# Patient Record
Sex: MALE | Race: WHITE | NOT HISPANIC OR LATINO | URBAN - METROPOLITAN AREA
[De-identification: names, ages, dates, MRNs, and addresses within clinical notes are randomized per-mention and may not be internally consistent; named-entity substitution may affect disease eponyms.]

---

## 2022-06-29 ENCOUNTER — EMERGENCY (EMERGENCY)
Facility: HOSPITAL | Age: 52
LOS: 0 days | Discharge: HOME | End: 2022-06-29
Attending: EMERGENCY MEDICINE | Admitting: EMERGENCY MEDICINE

## 2022-06-29 VITALS
SYSTOLIC BLOOD PRESSURE: 136 MMHG | WEIGHT: 265 LBS | HEART RATE: 73 BPM | DIASTOLIC BLOOD PRESSURE: 76 MMHG | RESPIRATION RATE: 17 BRPM | TEMPERATURE: 99 F | OXYGEN SATURATION: 99 %

## 2022-06-29 DIAGNOSIS — L02.212 CUTANEOUS ABSCESS OF BACK [ANY PART, EXCEPT BUTTOCK]: ICD-10-CM

## 2022-06-29 PROCEDURE — 10060 I&D ABSCESS SIMPLE/SINGLE: CPT

## 2022-06-29 PROCEDURE — 99283 EMERGENCY DEPT VISIT LOW MDM: CPT | Mod: 25

## 2022-06-29 RX ORDER — CEPHALEXIN 500 MG
1 CAPSULE ORAL
Qty: 28 | Refills: 0
Start: 2022-06-29 | End: 2022-07-05

## 2022-06-29 NOTE — ED PROVIDER NOTE - NSFOLLOWUPINSTRUCTIONS_ED_ALL_ED_FT
Follow up with your primary care doctor in 1-2 days     Abscess    WHAT YOU NEED TO KNOW:    A warm compress may help your abscess drain. Your healthcare provider may make a cut in the abscess so it can drain. You may need surgery to remove an abscess that is on your hands or buttocks.     DISCHARGE INSTRUCTIONS:    Return to the emergency department if:     The area around your abscess becomes very painful, warm, or has red streaks.      You have a fever and chills.      Your heart is beating faster than usual.       You feel faint or confused.    Contact your healthcare provider if:     Your abscess gets bigger or does not get better.      Your abscess returns.      You have questions or concerns about your condition or care.    Medicines: You may need any of the following:     Antibiotics help treat a bacterial infection.       Acetaminophen decreases pain and fever. It is available without a doctor's order. Ask how much to take and how often to take it. Follow directions. Acetaminophen can cause liver damage if not taken correctly.      NSAIDs, such as ibuprofen, help decrease swelling, pain, and fever. This medicine is available with or without a doctor's order. NSAIDs can cause stomach bleeding or kidney problems in certain people. If you take blood thinner medicine, always ask your healthcare provider if NSAIDs are safe for you. Always read the medicine label and follow directions.      Take your medicine as directed. Contact your healthcare provider if you think your medicine is not helping or if you have side effects. Tell him or her if you are allergic to any medicine. Keep a list of the medicines, vitamins, and herbs you take. Include the amounts, and when and why you take them. Bring the list or the pill bottles to follow-up visits. Carry your medicine list with you in case of an emergency.    Self-care:     Apply a warm compress to your abscess. This will help it open and drain. Wet a washcloth in warm, but not hot, water. Apply the compress for 10 minutes. Repeat this 4 times each day. Do not press on an abscess or try to open it with a needle. You may push the bacteria deeper or into your blood.       Do not share your clothes, towels, or sheets with anyone. This can spread the infection to others.       Wash your hands often. This can help prevent the spread of germs. Use soap and water or an alcohol-based hand rub.     Care for your wound after it is drained:     Care for your wound as directed. If your healthcare provider says it is okay, carefully remove the bandage and gauze packing. You may need to soak the gauze to get it out of your wound. Clean your wound and the area around it as directed. Dry the area and put on new, clean bandages. Change your bandages when they get wet or dirty.       Ask your healthcare provider how to change the gauze in your wound. Keep track of how many pieces of gauze are placed inside the wound. Do not put too much packing in the wound. Do not pack the gauze too tightly in your wound.     Follow up with your healthcare provider in 1 to 3 days: You may need to have your packing removed or your bandage changed. Write down your questions so you remember to ask them during your visits.        © Copyright [employer] 2019 All illustrations and images included in CareNotes are the copyrighted property of A.D.A.M., Inc. or [employer].

## 2022-06-29 NOTE — ED PROVIDER NOTE - OBJECTIVE STATEMENT
52-year-old male no segment past medical history comes to the emergency room for abscess on upper back.  Patient states he had similar abscess several years ago and was cut open.  Patient states that over the last couple days increasing redness swelling and tenderness to the area.  Denies fever

## 2022-06-29 NOTE — ED PROVIDER NOTE - NS ED ROS FT
Constitutional: (-) fever  Musculoskeletal: (-) neck pain, (-) back pain, (-) joint pain  Integumentary: (-) rash, (-) edema  Neurological: (-) headache, (-) altered mental status

## 2022-06-29 NOTE — ED PROVIDER NOTE - PATIENT PORTAL LINK FT
You can access the FollowMyHealth Patient Portal offered by [facility] by registering at the following website: [email or web address] By joining [email or web address] FollowMyHealth portal, you will also be able to view your health information using other applications (apps) compatible with our system.

## 2022-06-29 NOTE — ED PROVIDER NOTE - CLINICAL SUMMARY MEDICAL DECISION MAKING FREE TEXT BOX
52yF  pw abscess to back started 2-3 weeks ago - increasing. no systemic symptoms,    + I and D  + purulent outpt,  abx prescribed  Patient to be discharged from ED in well appearing condition. Verbal instructions given, including instructions to return to ED immediately for any new, worsening, or concerning symptoms. Limitations of ED work up discussed.  Patient reports understanding of above with capacity and insight. Written discharge instructions additionally given, including follow-up plan.

## 2022-06-29 NOTE — ED PROVIDER NOTE - CARE PROVIDER_API CALL
Justine Valenzuela)  Surgery  [address] [place] [address]  Phone: [phone]  Fax: [phone]  Follow Up Time:

## 2022-06-29 NOTE — ED ADULT NURSE NOTE - NSIMPLEMENTINTERV_GEN_ALL_ED
Implemented All Universal Safety Interventions:  [place] to call system. Call bell, personal items and telephone within reach. Instruct patient to call for assistance. Room bathroom lighting operational. Non-slip footwear when patient is off stretcher. Physically safe environment: no spills, clutter or unnecessary equipment. Stretcher in lowest position, wheels locked, appropriate side rails in place.

## 2022-06-29 NOTE — ED PROVIDER NOTE - PHYSICAL EXAMINATION
Physical Exam    Vital Signs: I have reviewed the initial vital signs.  Constitutional: well-nourished, appears stated age, no acute distress  Musculoskeletal: supple neck, no lower extremity edema, no midline tenderness  Integumentary: warm, dry, Positive left upper back abscess with surrounding redness and swelling noted to area positive fluctuance and induration  Neurologic: awake, alert, extremities’ motor and sensory functions grossly intact  Psychiatric: appropriate mood, appropriate affect

## 2022-07-15 ENCOUNTER — OUTPATIENT (OUTPATIENT)
Dept: OUTPATIENT SERVICES | Facility: HOSPITAL | Age: 52
LOS: 1 days | Discharge: HOME | End: 2022-07-15

## 2022-07-15 VITALS
HEART RATE: 70 BPM | OXYGEN SATURATION: 100 % | TEMPERATURE: 97 F | WEIGHT: 244.05 LBS | RESPIRATION RATE: 16 BRPM | DIASTOLIC BLOOD PRESSURE: 84 MMHG | HEIGHT: 69 IN | SYSTOLIC BLOOD PRESSURE: 137 MMHG

## 2022-07-15 DIAGNOSIS — Z01.818 ENCOUNTER FOR OTHER PREPROCEDURAL EXAMINATION: ICD-10-CM

## 2022-07-15 DIAGNOSIS — D21.6 BENIGN NEOPLASM OF CONNECTIVE AND OTHER SOFT TISSUE OF TRUNK, UNSPECIFIED: ICD-10-CM

## 2022-07-15 DIAGNOSIS — Z98.890 OTHER SPECIFIED POSTPROCEDURAL STATES: Chronic | ICD-10-CM

## 2022-07-15 DIAGNOSIS — Z96.642 PRESENCE OF LEFT ARTIFICIAL HIP JOINT: Chronic | ICD-10-CM

## 2022-07-15 LAB
ALBUMIN SERPL ELPH-MCNC: 5.1 G/DL — SIGNIFICANT CHANGE UP (ref 3.5–5.2)
ALP SERPL-CCNC: 123 U/L — HIGH (ref 30–115)
ALT FLD-CCNC: 28 U/L — SIGNIFICANT CHANGE UP (ref 0–41)
ANION GAP SERPL CALC-SCNC: 13 MMOL/L — SIGNIFICANT CHANGE UP (ref 7–14)
AST SERPL-CCNC: 25 U/L — SIGNIFICANT CHANGE UP (ref 0–41)
BASOPHILS # BLD AUTO: 0.04 K/UL — SIGNIFICANT CHANGE UP (ref 0–0.2)
BASOPHILS NFR BLD AUTO: 0.5 % — SIGNIFICANT CHANGE UP (ref 0–1)
BILIRUB SERPL-MCNC: 0.4 MG/DL — SIGNIFICANT CHANGE UP (ref 0.2–1.2)
BUN SERPL-MCNC: 26 MG/DL — HIGH (ref 10–20)
CALCIUM SERPL-MCNC: 9.2 MG/DL — SIGNIFICANT CHANGE UP (ref 8.5–10.1)
CHLORIDE SERPL-SCNC: 103 MMOL/L — SIGNIFICANT CHANGE UP (ref 98–110)
CO2 SERPL-SCNC: 22 MMOL/L — SIGNIFICANT CHANGE UP (ref 17–32)
CREAT SERPL-MCNC: 1 MG/DL — SIGNIFICANT CHANGE UP (ref 0.7–1.5)
EGFR: 90 ML/MIN/1.73M2 — SIGNIFICANT CHANGE UP
EOSINOPHIL # BLD AUTO: 0.27 K/UL — SIGNIFICANT CHANGE UP (ref 0–0.7)
EOSINOPHIL NFR BLD AUTO: 3.6 % — SIGNIFICANT CHANGE UP (ref 0–8)
GLUCOSE SERPL-MCNC: 85 MG/DL — SIGNIFICANT CHANGE UP (ref 70–99)
HCT VFR BLD CALC: 41.9 % — LOW (ref 42–52)
HGB BLD-MCNC: 14 G/DL — SIGNIFICANT CHANGE UP (ref 14–18)
IMM GRANULOCYTES NFR BLD AUTO: 0.1 % — SIGNIFICANT CHANGE UP (ref 0.1–0.3)
LYMPHOCYTES # BLD AUTO: 2.08 K/UL — SIGNIFICANT CHANGE UP (ref 1.2–3.4)
LYMPHOCYTES # BLD AUTO: 28 % — SIGNIFICANT CHANGE UP (ref 20.5–51.1)
MCHC RBC-ENTMCNC: 30.1 PG — SIGNIFICANT CHANGE UP (ref 27–31)
MCHC RBC-ENTMCNC: 33.4 G/DL — SIGNIFICANT CHANGE UP (ref 32–37)
MCV RBC AUTO: 90.1 FL — SIGNIFICANT CHANGE UP (ref 80–94)
MONOCYTES # BLD AUTO: 0.58 K/UL — SIGNIFICANT CHANGE UP (ref 0.1–0.6)
MONOCYTES NFR BLD AUTO: 7.8 % — SIGNIFICANT CHANGE UP (ref 1.7–9.3)
NEUTROPHILS # BLD AUTO: 4.45 K/UL — SIGNIFICANT CHANGE UP (ref 1.4–6.5)
NEUTROPHILS NFR BLD AUTO: 60 % — SIGNIFICANT CHANGE UP (ref 42.2–75.2)
NRBC # BLD: 0 /100 WBCS — SIGNIFICANT CHANGE UP (ref 0–0)
PLATELET # BLD AUTO: 303 K/UL — SIGNIFICANT CHANGE UP (ref 130–400)
POTASSIUM SERPL-MCNC: 4.5 MMOL/L — SIGNIFICANT CHANGE UP (ref 3.5–5)
POTASSIUM SERPL-SCNC: 4.5 MMOL/L — SIGNIFICANT CHANGE UP (ref 3.5–5)
PROT SERPL-MCNC: 7.6 G/DL — SIGNIFICANT CHANGE UP (ref 6–8)
RBC # BLD: 4.65 M/UL — LOW (ref 4.7–6.1)
RBC # FLD: 12.1 % — SIGNIFICANT CHANGE UP (ref 11.5–14.5)
SODIUM SERPL-SCNC: 138 MMOL/L — SIGNIFICANT CHANGE UP (ref 135–146)
WBC # BLD: 7.43 K/UL — SIGNIFICANT CHANGE UP (ref 4.8–10.8)
WBC # FLD AUTO: 7.43 K/UL — SIGNIFICANT CHANGE UP (ref 4.8–10.8)

## 2022-07-15 PROCEDURE — 93010 ELECTROCARDIOGRAM REPORT: CPT

## 2022-07-15 NOTE — H&P PST ADULT - NSANTHOSAYNRD_GEN_A_CORE
No. JAJA screening performed.  STOP BANG Legend: 0-2 = LOW Risk; 3-4 = INTERMEDIATE Risk; 5-8 = HIGH Risk

## 2022-07-15 NOTE — H&P PST ADULT - NSICDXFAMILYHX_GEN_ALL_CORE_FT
FAMILY HISTORY:  Mother  Still living? No  FH: breast cancer, Age at diagnosis: Age Unknown     [place] infant of 39 completed weeks of gestation

## 2022-07-15 NOTE — H&P PST ADULT - REASON FOR ADMISSION
51 y/o male presents to [facility] in preparation for excision mass of the back in [facility] under [facility]SB on 7/29/22 with Dr. Valenzuela

## 2022-07-15 NOTE — H&P PST ADULT - ATTENDING COMMENTS
I met with patient  reviewed consent  r/b/a explained to patient again  all questions answered  patient aware of risks including, but not exclusive of, infection, bleeding, neurovascular damage, numbness, keloid scar, dvt, pe

## 2022-07-15 NOTE — H&P PST ADULT - MUSCULOSKELETAL
After Visit Summary   11/22/2017    Carlos A Crespo    MRN: [id number]           Patient Information     Date Of Birth          1968        Visit Information        Provider Department      11/22/2017 6:35 PM Stefanie Matias APRN [facility]        Today's Diagnoses     Impacted cerumen of right ear    -  1      Care Instructions      Tinnitus (Ringing in the Ears)     Treatment may include maskers and hearing aids.     Tinnitus is the term for a noise in your ear not caused by an outside sound. The noise might be a ringing, clicking, hiss, or roar. It can vary in pitch and may be soft or quite loud. For some people, tinnitus is a minor nuisance. But for others, the noise can make it hard to hear, work, and even sleep. When tinnitus can't be cured, a number of treatments may offer relief.  What causes tinnitus?  Loud noises, hearing loss, and ear wax can cause tinnitus. So can certain medicines. Large amounts of aspirin or caffeine are sometimes to blame. In many cases, the exact cause of tinnitus is unknown.  How is tinnitus treated?  Identifying and removing the cause is the best way to treat tinnitus. For that reason, your healthcare provider may refer you to an otolaryngologist (ear, nose, and throat doctor). Your hearing may also be checked by an audiologist (hearing specialist). If you have hearing loss, wearing a hearing aid may help your tinnitus. When the cause can't be found, the tinnitus itself may be treated. Some of the treatments are listed below, and your healthcare provider can tell you more about them:    Maskers are small devices that look like hearing aids. They emit a pleasant sound that helps cover up the ringing in your ears. Hearing aids and maskers are sometimes used together.    Medicines that treat anxiety and depression may ease tinnitus in some people.    Hypnosis or relaxation therapy may help head noise seem less severe.    Tinnitus  retraining therapy combines counseling and maskers. Both can help take your mind off the tinnitus.  For more information    American Speech-Hearing-Language Association [phone] www.mau.org    American Tinnitus Association [phone] www.fannie.org    National [place] on Deafness and other Communication Disorders 128-496-6921 www.nidcd.nih.gov   Date Last Reviewed: 7/1/2016 2000-2017 [employer]. [address] [place] [address]. All rights reserved. This information is not intended as a substitute for professional medical care. Always follow your healthcare professional's instructions.              Follow-ups after your visit        Additional Services     OTOLARYNGOLOGY REFERRAL       Your provider has referred you to: [facility]: [facility] [phone]   [email or web address]    Please be aware that coverage of these services is subject to the terms and limitations of your health insurance plan.  Call member services at your health plan with any benefit or coverage questions.      Please bring the following with you to your appointment:    (1) Any X-Rays, CTs or MRIs which have been performed.  Contact the facility where they were done to arrange for  prior to your scheduled appointment.   (2) List of current medications  (3) This referral request   (4) Any documents/labs given to you for this referral                  Who to contact     If you have questions or need follow up information about today's clinic visit or your schedule please contact [facility] directly at [phone].  Normal or non-critical lab and imaging results will be communicated to you by MyChart, letter or phone within 4 business days after the clinic has received the results. If you do not hear from us within 7 days, please contact the clinic through MyChart or phone. If you have a critical or abnormal lab result, we will  notify you by phone as soon as possible.  Submit refill requests through [employer] or call your pharmacy and they will forward the refill request to us. Please allow 3 business days for your refill to be completed.          Additional Information About Your Visit        [employer] Information     [employer] gives you secure access to your electronic health record. If you see a primary care provider, you can also send messages to your care team and make appointments. If you have questions, please call your primary care clinic.  If you do not have a primary care provider, please call [phone] and they will assist you.        Care EveryWhere ID     This is your Care EveryWhere ID. This could be used by other organizations to access your [facility] medical records  [id number]        Your Vitals Were     Pulse Temperature Pulse Oximetry BMI (Body Mass Index)          65 98.1  F (36.7  C) (Oral) 99% 28.03 kg/m2         Blood Pressure from Last 3 Encounters:   11/22/17 (!) 141/97   11/10/17 138/80   10/25/16 134/82    Weight from Last 3 Encounters:   11/22/17 201 lb (91.2 kg)   11/10/17 197 lb 1.6 oz (89.4 kg)   10/25/16 194 lb (88 kg)              We Performed the Following     OTOLARYNGOLOGY REFERRAL        Primary Care Provider Office Phone # Fax #    Frances Raji Upton [phone] [address] [facility] [address]        Equal Access to Services     [facility] : Hadii aad ku hadasho Soomaali, waaxda luqadaha, qaybta kaalmada adeegyada, abel summers ah. So [facility] [phone].    ATENCIÓN: Si habla español, tiene a munoz disposición servicios gratuitos de asistencia lingüística. Llame al [phone].    We comply with applicable federal civil rights laws and [place] laws. We do not discriminate on the basis of race, color, national origin, age, disability, sex, sexual orientation, or gender identity.            Thank you!     Thank you for choosing [facility]  [facility]. Our goal is always to provide you with excellent care. Hearing back from our patients is one way we can continue to improve our services. Please take a few minutes to complete the written survey that you may receive in the mail after your visit with us. Thank you!             Your Updated Medication List - Protect others around you: Learn how to safely use, store and throw away your medicines at www.disposemymeds.org.          This list is accurate as of: 11/22/17  7:18 PM.  Always use your most recent med list.                   Brand Name Dispense Instructions for use Diagnosis    albuterol 108 (90 BASE) MCG/ACT Inhaler    PROAIR HFA/PROVENTIL HFA/VENTOLIN HFA    1 Inhaler    Inhale 2 puffs into the lungs every 4 hours as needed for shortness of breath / dyspnea or wheezing    Viral URI with cough       ibuprofen 200 MG tablet    ADVIL/MOTRIN     Take 200 mg by mouth every 4 hours as needed for mild pain        terbinafine 250 MG tablet    lamISIL    90 tablet    Take 1 tablet (250 mg) by mouth daily    Onychomycosis          normal/ROM intact/normal gait/strength 5/5 bilateral upper extremities/strength 5/5 bilateral lower extremities

## 2022-07-28 NOTE — ASU PATIENT PROFILE, ADULT - FALL HARM RISK - UNIVERSAL INTERVENTIONS
Bed in lowest position, wheels locked, appropriate side rails in place/Call bell, personal items and telephone in reach/Instruct patient to call for assistance before getting out of bed or chair/Non-slip footwear when patient is out of bed/[place] to call system/Physically safe environment - no spills, clutter or unnecessary equipment/Purposeful Proactive Rounding/Room/bathroom lighting operational, light cord in reach

## 2022-07-29 ENCOUNTER — OUTPATIENT (OUTPATIENT)
Dept: OUTPATIENT SERVICES | Facility: HOSPITAL | Age: 52
LOS: 1 days | Discharge: HOME | End: 2022-07-29

## 2022-07-29 VITALS
DIASTOLIC BLOOD PRESSURE: 71 MMHG | TEMPERATURE: 98 F | HEART RATE: 71 BPM | RESPIRATION RATE: 18 BRPM | WEIGHT: 229.94 LBS | SYSTOLIC BLOOD PRESSURE: 116 MMHG | HEIGHT: 70 IN

## 2022-07-29 VITALS
SYSTOLIC BLOOD PRESSURE: 109 MMHG | OXYGEN SATURATION: 99 % | HEART RATE: 60 BPM | DIASTOLIC BLOOD PRESSURE: 65 MMHG | RESPIRATION RATE: 12 BRPM

## 2022-07-29 DIAGNOSIS — Z96.642 PRESENCE OF LEFT ARTIFICIAL HIP JOINT: Chronic | ICD-10-CM

## 2022-07-29 DIAGNOSIS — Z98.890 OTHER SPECIFIED POSTPROCEDURAL STATES: Chronic | ICD-10-CM

## 2022-07-29 RX ORDER — MORPHINE SULFATE 50 MG/1
4 CAPSULE, EXTENDED RELEASE ORAL
Refills: 0 | Status: DISCONTINUED | OUTPATIENT
Start: 2022-07-29 | End: 2022-07-29

## 2022-07-29 RX ORDER — SODIUM CHLORIDE 9 MG/ML
1000 INJECTION, SOLUTION INTRAVENOUS
Refills: 0 | Status: DISCONTINUED | OUTPATIENT
Start: 2022-07-29 | End: 2022-07-29

## 2022-07-29 RX ORDER — CEPHALEXIN 500 MG
1 CAPSULE ORAL
Qty: 10 | Refills: 0
Start: 2022-07-29 | End: 2022-08-02

## 2022-07-29 NOTE — ASU DISCHARGE PLAN (ADULT/PEDIATRIC) - CARE PROVIDER_API CALL
Justine Valenzuela)  Surgery  [address] [place] [address]  Phone: [phone]  Fax: [phone]  Established Patient  Follow Up Time: 2 weeks

## 2022-07-29 NOTE — BRIEF OPERATIVE NOTE - OPERATION/FINDINGS
Excision of right back mass, approx 5cm elliptical incision, likely sebaceous cyst, removed intact.  Incision closed with 3-0 Vicryl and 2-0 nylon sutures.

## 2022-07-29 NOTE — ASU PREOP CHECKLIST - BLOOD AVAILABLE
no t and s or confirmation on chart anesthesia aware no t and s or confirmation on chart anesthesia aware Dr Fournier aware

## 2022-07-29 NOTE — ASU DISCHARGE PLAN (ADULT/PEDIATRIC) - NS MD DC FALL RISK RISK
For information on Fall & Injury Prevention, visit: [email or web address] OR  [email or web address] OR  https://www.cdc.gov/steadi/patient.html

## 2022-07-29 NOTE — ASU DISCHARGE PLAN (ADULT/PEDIATRIC) - ASU DC SPECIAL INSTRUCTIONSFT
For pain, take OTC Tylenol and Motrin  For severe pain, fill prescription of Percocet and take as directed  Leave dressing in place until followup visit in office, may shower over dressing  Followup with Dr. Valenzuela in office on Tues August 9th

## 2022-08-01 LAB — SURGICAL PATHOLOGY STUDY: SIGNIFICANT CHANGE UP

## 2022-08-04 DIAGNOSIS — L72.0 EPIDERMAL CYST: ICD-10-CM

## 2022-10-13 NOTE — ASU PREOP CHECKLIST - HAIR REMOVAL
hair removal not indicated Arava Pregnancy And Lactation Text: This medication is Pregnancy Category X and is absolutely contraindicated during pregnancy. It is unknown if it is excreted in breast milk.

## 2022-11-10 ENCOUNTER — EMERGENCY (EMERGENCY)
Facility: HOSPITAL | Age: 52
LOS: 0 days | Discharge: HOME | End: 2022-11-10
Attending: EMERGENCY MEDICINE | Admitting: EMERGENCY MEDICINE

## 2022-11-10 VITALS
SYSTOLIC BLOOD PRESSURE: 150 MMHG | TEMPERATURE: 98 F | RESPIRATION RATE: 18 BRPM | HEART RATE: 67 BPM | DIASTOLIC BLOOD PRESSURE: 87 MMHG | OXYGEN SATURATION: 97 %

## 2022-11-10 VITALS
RESPIRATION RATE: 18 BRPM | TEMPERATURE: 98 F | DIASTOLIC BLOOD PRESSURE: 87 MMHG | OXYGEN SATURATION: 98 % | SYSTOLIC BLOOD PRESSURE: 139 MMHG | HEART RATE: 73 BPM

## 2022-11-10 DIAGNOSIS — Z86.19 PERSONAL HISTORY OF OTHER INFECTIOUS AND PARASITIC DISEASES: ICD-10-CM

## 2022-11-10 DIAGNOSIS — Z96.642 PRESENCE OF LEFT ARTIFICIAL HIP JOINT: Chronic | ICD-10-CM

## 2022-11-10 DIAGNOSIS — R21 RASH AND OTHER NONSPECIFIC SKIN ERUPTION: ICD-10-CM

## 2022-11-10 DIAGNOSIS — F17.200 NICOTINE DEPENDENCE, UNSPECIFIED, UNCOMPLICATED: ICD-10-CM

## 2022-11-10 DIAGNOSIS — Z98.890 OTHER SPECIFIED POSTPROCEDURAL STATES: Chronic | ICD-10-CM

## 2022-11-10 PROBLEM — R22.2 LOCALIZED SWELLING, MASS AND LUMP, TRUNK: Chronic | Status: ACTIVE | Noted: 2022-07-15

## 2022-11-10 LAB — HIV 1 & 2 AB SERPL IA.RAPID: SIGNIFICANT CHANGE UP

## 2022-11-10 PROCEDURE — 99283 EMERGENCY DEPT VISIT LOW MDM: CPT

## 2022-11-10 RX ORDER — PENICILLIN G BENZATHINE 1200000 [IU]/2ML
2.4 INJECTION, SUSPENSION INTRAMUSCULAR ONCE
Refills: 0 | Status: COMPLETED | OUTPATIENT
Start: 2022-11-10 | End: 2022-11-10

## 2022-11-10 RX ADMIN — PENICILLIN G BENZATHINE 2.4 MILLION UNIT(S): 1200000 INJECTION, SUSPENSION INTRAMUSCULAR at 18:44

## 2022-11-10 NOTE — ED PROVIDER NOTE - PHYSICAL EXAMINATION
CONSTITUTIONAL: in no acute distress, afebrile  SKIN: Warm, dry; diffuse pimple-like punctate, tender, erythematous, raised rashes to head, neck, trunk, LE;  Non pruritic, no active bleeding or discharge, negative nikolsky sign, no involvement of mucocutaneous areas  HEAD: Normocephalic; atraumatic  EYES: No conjunctival injection. EOMI  ENT: No nasal discharge; oropharynx nonerythematous; airway clear  NECK: Supple; non tender  CARD:  Regular rate and rhythm  RESP: CTAB  ABD: Soft NTND; No guarding or rebound tenderness  : Normal appearing male genitalia, nttp, no masses/lesions/erythema/discharge. Cremasteric reflex intact.  EXT: Normal ROM.  No clubbing or cyanosis.  No edema. No calf tenderness  NEURO: A&O x3, grossly unremarkable, no focal deficits  PSYCH: Cooperative, appropriate  *Chaperone PCA Aleksey was used during the encounter. A professional environment was maintained and discussed with patient

## 2022-11-10 NOTE — ED PROVIDER NOTE - NSFOLLOWUPINSTRUCTIONS_ED_ALL_ED_FT
- Please follow up with your Primary Care Physician and the Dermatologist  - We will call you if any results are positive      FOLLICULITIS - AfterCare(R) Instructions(ER/ED)     Folliculitis    WHAT YOU NEED TO KNOW:    Folliculitis is inflammation of your hair follicles. A hair follicle is a sac under your skin. Your hair grows out of the follicle. Folliculitis is caused by bacteria or fungus, most commonly a germ called Staph. Folliculitis can occur anywhere you have hair.     DISCHARGE INSTRUCTIONS:    Medicines:     Antibiotics: This medicine is given to fight or prevent an infection caused by bacteria. It may be given as an ointment that you apply to your skin or as a pill. Always take your antibiotics exactly as ordered by your healthcare provider. Never save antibiotics or take leftover antibiotics that were given to you for another illness.      Antifungal medicine: This medicine helps kill fungus that may be causing your folliculitis. It may be given as an cream that you apply to your skin or as a pill.       NSAIDs, such as ibuprofen, help decrease swelling, pain, and fever. This medicine is available with or without a doctor's order. NSAIDs can cause stomach bleeding or kidney problems in certain people. If you take blood thinner medicine, always ask if NSAIDs are safe for you. Always read the medicine label and follow directions. Do not give these medicines to children under 6 months of age without direction from your child's healthcare provider.      Antihistamines: This medicine may be given to help decrease itching.       Take your medicine as directed. Contact your healthcare provider if you think your medicine is not helping or if you have side effects. Tell him of her if you are allergic to any medicine. Keep a list of the medicines, vitamins, and herbs you take. Include the amounts, and when and why you take them. Bring the list or the pill bottles to follow-up visits. Carry your medicine list with you in case of an emergency.    Follow up with your healthcare provider or dermatologist as directed: Write down your questions so you remember to ask them during your visits.    Manage folliculitis:     Use warm compresses: Wet a washcloth with warm water and apply it to the infected skin area to help decrease pain and swelling. Warm compresses may also help drain pus and improve healing.       Clean the area: Use antibacterial soap to wash the affected area. Change your washcloths and towels every day.      Avoid shaving the area: If possible, do not shave areas that have folliculitis. If you must shave, use an electric razor or new blade every time you shave.     Prevent folliculitis:     Do not share personal items: Do not share towels, soap, or any personal items with other people.      Do not wear tight clothing: Do not wear tight-fitting clothes that rub against and irritate your skin.      Treat skin injuries right away: Treat injuries such as cuts and scrapes right away. Wash them with warm, soapy water, and cover the area to prevent infection.    Contact your healthcare provider or dermatologist if:    You have a fever.      You have foul-smelling pus coming from the bumps on your skin.      Your rash is spreading.      You have questions or concerns about your condition or care.    Return to the emergency department if:    You develop large areas of red, warm, tender skin around the folliculitis.      You develop boils.          © Copyright [employer] 2019 All illustrations and images included in CareNotes are the copyrighted property of [employer] Inc[employer] or [employer].

## 2022-11-10 NOTE — ED ADULT NURSE NOTE - NSIMPLEMENTINTERV_GEN_ALL_ED
Implemented All Universal Safety Interventions:  [place] to call system. Call bell, personal items and telephone within reach. Instruct patient to call for assistance. Room bathroom lighting operational. Non-slip footwear when patient is off stretcher. Physically safe environment: no spills, clutter or unnecessary equipment. Stretcher in lowest position, wheels locked, appropriate side rails in place.

## 2022-11-10 NOTE — ED PROVIDER NOTE - CLINICAL SUMMARY MEDICAL DECISION MAKING FREE TEXT BOX
patient presents for evaluation of rash states that he was sexually active with multiple new partners while vacationing in [facility] 13 days ago is concerned for syphilis though the current rash is not consistent with syphilis he had no pain in the chancres no palm sole or mucous membrane involvement at this time most consistent with cysts folliculitis however patient is concerned for syphilis offered full STD testing patient excepted I will empirically treat follow-up to his PMD we discussed indications to return at this time

## 2022-11-10 NOTE — ED PROVIDER NOTE - CARE PROVIDER_API CALL
Will Hanson)  Internal Medicine  [address] [place] [address], [address]  [place] [address]  Phone: [phone]  Fax: [phone]  Follow Up Time: 1-3 Days

## 2022-11-10 NOTE — ED PROVIDER NOTE - OBJECTIVE STATEMENT
52-year-old male with PMH of syphilis who presents with diffuse rash since returning from [place] 13 days ago.  Patient noticed pimple-like rashes on head, neck, chest, legs.  PMD diagnosed with folliculitis, patient has been taking Keflex.  Nonpruritic.  Patient was sexually active with multiple females in [place], uses protection.  Patient denies any drug use but takes Testosterone supplements.  Patient denies fevers, chills, chest pain, shortness of breath, testicular pain, penile pain or discharge, groin pain, numbness, tingling, neck pain.

## 2022-11-10 NOTE — ED PROVIDER NOTE - PROGRESS NOTE DETAILS
AH - Patient educated and will follow-up. Patient to be discharged from ED. Any available test results were discussed with patient and/or family. Verbal instructions given, including instructions to return to ED immediately for any new, worsening, or concerning symptoms. Strict return precautions given. Written discharge instructions additionally given, including follow-up plan

## 2022-11-10 NOTE — ED ADULT NURSE NOTE - CHIEF COMPLAINT QUOTE
Pt states " I went to [facility] for 7 days and came back home 10/28. Since I came home I have this rash on my head, chest and alittle on leg. Went to primary he put me on ABX its not getting better"

## 2022-11-10 NOTE — ED PROVIDER NOTE - PATIENT PORTAL LINK FT
You can access the FollowMyHealth Patient Portal offered by [facility] by registering at the following website: [email or web address]. By joining [employer]’s FollowMyHealth portal, you will also be able to view your health information using other applications (apps) compatible with our system.

## 2022-11-10 NOTE — ED PROVIDER NOTE - ATTENDING APP SHARED VISIT CONTRIBUTION OF CARE
Patient is a 52-year-old male past medical history of syphilis presents with rash on occipital region after returning from [place] 13 days ago states that he has "pimple-like" rash on occiput and neck diagnosed with folliculitis taking Keflex after visit from his primary care doc rash is nonpruritic nonpainful nature no fevers no chills denies any dysuria hesitancy or frequency has no chest pain no cough no shortness of breath patient has no testicular pain no urethral discharge no painful rashes in the groin    On physical exam patient is normocephalic atraumatic pupils equal round react light accommodation extraocular muscle intact oropharynx clear chest bilaterally abdomen soft nontender nondistended bowel sounds positive no guarding or rebound tremors forage motion no focal deficits skin patient has follicular rash noted at the base of the occiput with no superinfection no involvement of palms soles or mucous membranes     assessment plan-patient presents for evaluation of rash states that he was sexually active with multiple new partners while vacationing in [facility] 13 days ago is concerned for syphilis though the current rash is not consistent with syphilis he had no pain in the chancres no palm sole or mucous membrane involvement at this time most consistent with cysts folliculitis however patient is concerned for syphilis offered full STD testing patient excepted I will empirically treat follow-up to his PMD we discussed indications to return at this time

## 2022-11-10 NOTE — ED PROVIDER NOTE - NS ED ATTENDING STATEMENT MOD
This was a shared visit with the SARA. I reviewed and verified the documentation and independently performed the documented: Attending with

## 2022-11-10 NOTE — ED ADULT TRIAGE NOTE - CHIEF COMPLAINT QUOTE
Pt states " I went to [facility] for 7 days and came back home 10/28. Since I came home I have this rash on my head, chest and alittle on leg. Went to primary he put me on ABX its not getting better"

## 2022-11-10 NOTE — ED PROVIDER NOTE - ATTENDING CONTRIBUTION TO CARE
Patient is a 52-year-old male past medical history of syphilis presents with rash on occipital region after returning from [place] 13 days ago states that he has "pimple-like" rash on occiput and neck diagnosed with folliculitis taking Keflex after visit from his primary care doc rash is nonpruritic nonpainful nature no fevers no chills denies any dysuria hesitancy or frequency has no chest pain no cough no shortness of breath patient has no testicular pain no urethral discharge no painful rashes in the groin    On physical exam patient is normocephalic atraumatic pupils equal round react light accommodation extraocular muscle intact oropharynx clear chest bilaterally abdomen soft nontender nondistended bowel sounds positive no guarding or rebound tremors forage motion no focal deficits skin patient has follicular rash noted at the base of the occiput with no superinfection no involvement of palms soles or mucous membranes     assessment plan-patient presents for evaluation of rash states that he was sexually active with multiple new partners while vacationing in [facility] 13 days ago is concerned for syphilis though the current rash is not consistent with syphilis he had no pain in the chancres no palm sole or mucous membrane involvement at this time most consistent with cysts folliculitis however patient is concerned for syphilis offered full STD testing patient excepted I will empirically treat follow-up to his PMD we discussed indications to return at this time

## 2022-11-10 NOTE — ED PROVIDER NOTE - NS ED ROS FT
Review of Systems:  CONSTITUTIONAL: No fever, No diaphoresis, No generalized weakness  SKIN: + rash  HEMATOLOGIC: No abnormal bleeding or bruising  EYES: No eye pain, No blurred vision  ENT: No change in hearing, No sore throat, No neck pain, No rhinorrhea, No ear pain  RESPIRATORY: No shortness of breath, No cough  CARDIAC: No chest pain, No palpitations  GI: No abdominal pain, No nausea, No vomiting, No diarrhea, No constipation, No bright red blood per rectum, No melena. No flank pain  : No dysuria, frequency, hematuria, testicular or penile pain  MUSCULOSKELETAL: No joint paint, No swelling, No back pain  NEUROLOGIC: No numbness, No focal weakness, No headache, No dizziness  All other systems negative, unless specified in HPI

## 2022-11-11 LAB
C TRACH RRNA SPEC QL NAA+PROBE: SIGNIFICANT CHANGE UP
N GONORRHOEA RRNA SPEC QL NAA+PROBE: SIGNIFICANT CHANGE UP
RPR SER-TITR: (no result)
RPR SERPL-ACNC: REACTIVE
SPECIMEN SOURCE: SIGNIFICANT CHANGE UP
T PALLIDUM AB TITR SER: POSITIVE

## 2023-02-08 ENCOUNTER — EMERGENCY (EMERGENCY)
Facility: HOSPITAL | Age: 53
LOS: 0 days | Discharge: HOME | End: 2023-02-08
Attending: EMERGENCY MEDICINE
Payer: COMMERCIAL

## 2023-02-08 VITALS
OXYGEN SATURATION: 98 % | RESPIRATION RATE: 18 BRPM | SYSTOLIC BLOOD PRESSURE: 130 MMHG | HEART RATE: 60 BPM | DIASTOLIC BLOOD PRESSURE: 60 MMHG

## 2023-02-08 VITALS
HEIGHT: 69 IN | OXYGEN SATURATION: 97 % | HEART RATE: 68 BPM | DIASTOLIC BLOOD PRESSURE: 90 MMHG | SYSTOLIC BLOOD PRESSURE: 145 MMHG | RESPIRATION RATE: 20 BRPM | WEIGHT: 240.08 LBS | TEMPERATURE: 97 F

## 2023-02-08 DIAGNOSIS — Z98.890 OTHER SPECIFIED POSTPROCEDURAL STATES: Chronic | ICD-10-CM

## 2023-02-08 DIAGNOSIS — Z96.642 PRESENCE OF LEFT ARTIFICIAL HIP JOINT: ICD-10-CM

## 2023-02-08 DIAGNOSIS — R21 RASH AND OTHER NONSPECIFIC SKIN ERUPTION: ICD-10-CM

## 2023-02-08 DIAGNOSIS — Z96.642 PRESENCE OF LEFT ARTIFICIAL HIP JOINT: Chronic | ICD-10-CM

## 2023-02-08 DIAGNOSIS — R00.1 BRADYCARDIA, UNSPECIFIED: ICD-10-CM

## 2023-02-08 DIAGNOSIS — Z20.822 CONTACT WITH AND (SUSPECTED) EXPOSURE TO COVID-19: ICD-10-CM

## 2023-02-08 DIAGNOSIS — L73.9 FOLLICULAR DISORDER, UNSPECIFIED: ICD-10-CM

## 2023-02-08 DIAGNOSIS — R68.83 CHILLS (WITHOUT FEVER): ICD-10-CM

## 2023-02-08 LAB
ALBUMIN SERPL ELPH-MCNC: 5 G/DL — SIGNIFICANT CHANGE UP (ref 3.5–5.2)
ALP SERPL-CCNC: 150 U/L — HIGH (ref 30–115)
ALT FLD-CCNC: 40 U/L — SIGNIFICANT CHANGE UP (ref 0–41)
ANION GAP SERPL CALC-SCNC: 12 MMOL/L — SIGNIFICANT CHANGE UP (ref 7–14)
AST SERPL-CCNC: 38 U/L — SIGNIFICANT CHANGE UP (ref 0–41)
BASOPHILS # BLD AUTO: 0.03 K/UL — SIGNIFICANT CHANGE UP (ref 0–0.2)
BASOPHILS NFR BLD AUTO: 0.3 % — SIGNIFICANT CHANGE UP (ref 0–1)
BILIRUB SERPL-MCNC: 1.1 MG/DL — SIGNIFICANT CHANGE UP (ref 0.2–1.2)
BUN SERPL-MCNC: 30 MG/DL — HIGH (ref 10–20)
CALCIUM SERPL-MCNC: 9.8 MG/DL — SIGNIFICANT CHANGE UP (ref 8.4–10.5)
CHLORIDE SERPL-SCNC: 101 MMOL/L — SIGNIFICANT CHANGE UP (ref 98–110)
CK SERPL-CCNC: 825 U/L — HIGH (ref 0–225)
CO2 SERPL-SCNC: 26 MMOL/L — SIGNIFICANT CHANGE UP (ref 17–32)
CREAT SERPL-MCNC: 0.9 MG/DL — SIGNIFICANT CHANGE UP (ref 0.7–1.5)
EGFR: 101 ML/MIN/1.73M2 — SIGNIFICANT CHANGE UP
EOSINOPHIL # BLD AUTO: 0.15 K/UL — SIGNIFICANT CHANGE UP (ref 0–0.7)
EOSINOPHIL NFR BLD AUTO: 1.6 % — SIGNIFICANT CHANGE UP (ref 0–8)
FLUAV AG NPH QL: SIGNIFICANT CHANGE UP
FLUBV AG NPH QL: SIGNIFICANT CHANGE UP
GLUCOSE SERPL-MCNC: 92 MG/DL — SIGNIFICANT CHANGE UP (ref 70–99)
HCT VFR BLD CALC: 44.9 % — SIGNIFICANT CHANGE UP (ref 42–52)
HGB BLD-MCNC: 14.9 G/DL — SIGNIFICANT CHANGE UP (ref 14–18)
IMM GRANULOCYTES NFR BLD AUTO: 0.2 % — SIGNIFICANT CHANGE UP (ref 0.1–0.3)
LACTATE SERPL-SCNC: 1.4 MMOL/L — SIGNIFICANT CHANGE UP (ref 0.7–2)
LYMPHOCYTES # BLD AUTO: 1.75 K/UL — SIGNIFICANT CHANGE UP (ref 1.2–3.4)
LYMPHOCYTES # BLD AUTO: 19.2 % — LOW (ref 20.5–51.1)
MAGNESIUM SERPL-MCNC: 2 MG/DL — SIGNIFICANT CHANGE UP (ref 1.8–2.4)
MCHC RBC-ENTMCNC: 30.3 PG — SIGNIFICANT CHANGE UP (ref 27–31)
MCHC RBC-ENTMCNC: 33.2 G/DL — SIGNIFICANT CHANGE UP (ref 32–37)
MCV RBC AUTO: 91.4 FL — SIGNIFICANT CHANGE UP (ref 80–94)
MONOCYTES # BLD AUTO: 0.68 K/UL — HIGH (ref 0.1–0.6)
MONOCYTES NFR BLD AUTO: 7.5 % — SIGNIFICANT CHANGE UP (ref 1.7–9.3)
NEUTROPHILS # BLD AUTO: 6.47 K/UL — SIGNIFICANT CHANGE UP (ref 1.4–6.5)
NEUTROPHILS NFR BLD AUTO: 71.2 % — SIGNIFICANT CHANGE UP (ref 42.2–75.2)
NRBC # BLD: 0 /100 WBCS — SIGNIFICANT CHANGE UP (ref 0–0)
PLATELET # BLD AUTO: 272 K/UL — SIGNIFICANT CHANGE UP (ref 130–400)
POTASSIUM SERPL-MCNC: 4.7 MMOL/L — SIGNIFICANT CHANGE UP (ref 3.5–5)
POTASSIUM SERPL-SCNC: 4.7 MMOL/L — SIGNIFICANT CHANGE UP (ref 3.5–5)
PROT SERPL-MCNC: 7.9 G/DL — SIGNIFICANT CHANGE UP (ref 6–8)
RBC # BLD: 4.91 M/UL — SIGNIFICANT CHANGE UP (ref 4.7–6.1)
RBC # FLD: 13 % — SIGNIFICANT CHANGE UP (ref 11.5–14.5)
RSV RNA NPH QL NAA+NON-PROBE: SIGNIFICANT CHANGE UP
SARS-COV-2 RNA SPEC QL NAA+PROBE: SIGNIFICANT CHANGE UP
SODIUM SERPL-SCNC: 139 MMOL/L — SIGNIFICANT CHANGE UP (ref 135–146)
TROPONIN T SERPL-MCNC: <0.01 NG/ML — SIGNIFICANT CHANGE UP
WBC # BLD: 9.1 K/UL — SIGNIFICANT CHANGE UP (ref 4.8–10.8)
WBC # FLD AUTO: 9.1 K/UL — SIGNIFICANT CHANGE UP (ref 4.8–10.8)

## 2023-02-08 PROCEDURE — 70450 CT HEAD/BRAIN W/O DYE: CPT | Mod: MA

## 2023-02-08 PROCEDURE — 93005 ELECTROCARDIOGRAM TRACING: CPT

## 2023-02-08 PROCEDURE — 85025 COMPLETE CBC W/AUTO DIFF WBC: CPT

## 2023-02-08 PROCEDURE — 83605 ASSAY OF LACTIC ACID: CPT

## 2023-02-08 PROCEDURE — 99285 EMERGENCY DEPT VISIT HI MDM: CPT

## 2023-02-08 PROCEDURE — 0241U: CPT

## 2023-02-08 PROCEDURE — 36415 COLL VENOUS BLD VENIPUNCTURE: CPT

## 2023-02-08 PROCEDURE — 96372 THER/PROPH/DIAG INJ SC/IM: CPT

## 2023-02-08 PROCEDURE — 82550 ASSAY OF CK (CPK): CPT

## 2023-02-08 PROCEDURE — 99285 EMERGENCY DEPT VISIT HI MDM: CPT | Mod: 25

## 2023-02-08 PROCEDURE — 80053 COMPREHEN METABOLIC PANEL: CPT

## 2023-02-08 PROCEDURE — 83735 ASSAY OF MAGNESIUM: CPT

## 2023-02-08 PROCEDURE — 84484 ASSAY OF TROPONIN QUANT: CPT

## 2023-02-08 PROCEDURE — 93010 ELECTROCARDIOGRAM REPORT: CPT

## 2023-02-08 PROCEDURE — 70450 CT HEAD/BRAIN W/O DYE: CPT | Mod: 26,MA

## 2023-02-08 RX ORDER — SODIUM CHLORIDE 9 MG/ML
1000 INJECTION, SOLUTION INTRAVENOUS ONCE
Refills: 0 | Status: COMPLETED | OUTPATIENT
Start: 2023-02-08 | End: 2023-02-08

## 2023-02-08 RX ORDER — PENICILLIN G BENZATHINE 1200000 [IU]/2ML
2.4 INJECTION, SUSPENSION INTRAMUSCULAR ONCE
Refills: 0 | Status: COMPLETED | OUTPATIENT
Start: 2023-02-08 | End: 2023-02-08

## 2023-02-08 RX ADMIN — SODIUM CHLORIDE 1000 MILLILITER(S): 9 INJECTION, SOLUTION INTRAVENOUS at 15:55

## 2023-02-08 RX ADMIN — PENICILLIN G BENZATHINE 2.4 MILLION UNIT(S): 1200000 INJECTION, SUSPENSION INTRAMUSCULAR at 20:22

## 2023-02-08 NOTE — ED PROVIDER NOTE - CARE PROVIDER_API CALL
Will Hanson)  Internal Medicine  [address] [place] [address], [address]  [place] [address]  Phone: [phone]  Fax: [phone]  Follow Up Time:

## 2023-02-08 NOTE — ED PROVIDER NOTE - OBJECTIVE STATEMENT
patient c/o rash to back of head since nov 2022, treated for folliculitis with ABX and Bactroban with improvement, C/o feeling tired , fatigued legs feel weak with worsening of his intentional tremor past 2 days, No fever, + chills, no HA, no neck pain, no SOB

## 2023-02-08 NOTE — ED PROVIDER NOTE - PATIENT PORTAL LINK FT
You can access the FollowMyHealth Patient Portal offered by [facility] by registering at the following website: [email or web address]. By joining [employer]’s FollowMyHealth portal, you will also be able to view your health information using other applications (apps) compatible with our system.

## 2023-02-08 NOTE — ED PROVIDER NOTE - CLINICAL SUMMARY MEDICAL DECISION MAKING FREE TEXT BOX
Labs and imaging obtained.  Patient with normal white blood cell count.  Results reviewed and discussed with patient.  I discussed with patient's PMD.  Patient will receive penicillin injection here since he was waiting for preauthorization from his insurance company. he will see Dr. Margie garcia this week

## 2023-02-08 NOTE — ED PROVIDER NOTE - SKIN, MLM
multiple small crusting lesion to back of head wear patient shaves his head consistent with folliculitis, no drainage, no cellulitis

## 2023-02-08 NOTE — ED ADULT TRIAGE NOTE - BEFAST BALANCE
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  Not requiring oxygen.  -Chest CT obtained with new bronchovascular nodularity throughout the right lung, concern for endobronchial spread of infection versus bronchiolitis versus pneumonitis  -Strep swab negative at urgent care  -Sputum analysis ordered  -Respiratory viral panel  -Legionella/strep pneumoniae antigens ordered  -Ordered blood cultures, Pro-Javier, follow  -Given clinical stability, will continue empiric antibiotics for CAP with ceftriaxone and azithromycin  -Pulmonary consult placed  -Benzocaine throat spray    Urinary tract infection, cystitis  -Had abnormal UA at urgent care with associated dysuria and frequency of micturition.  -Continue ceftriaxone, follow urinary cultures    Hyponatremia  Sodium 132 in urgent care, likely due to poor p.o. intake in the last day or so  -IV fluids overnight recheck in a.m.    History of Sjogren's syndrome  Sjogren related interstitial lung disease  History of pulmonary fibrosis  Sjogren's syndrome primarily involves her dry eye and dry mouth and also with ILD  -Follows with Dr. Velasquez  -ED provider spoke with pulmonary, okay for now to continue PTA azathioprine  "25 mg    History of pulmonary hypertension  Felt likely to be WHO group 2 due to left-sided heart disease, mild with elevated PC WP of 17 mg of mercury suggesting mostly postcapillary pulmonary hypertension.  Follows with [facility] cardiology  -Not on any PAH specific meds    Leukopenia, chronic   Thrombocytopenia, chronic, ITP  Baselines appears to be low 70s.   Continue to monitor    Hypertension  At home takes amlodipine 2.5, carvedilol 3.125 mg.  Holding amlodipine for now, resume if continued clinical stability    Hypothyroidism  Continue PTA levothyroxine    History of cryptogenic cirrhosis   -Resume PTA ursodiol. Hold PTA spironolactone, can resume if continued clinical stability    History of neutrophilic keratitis corneal melt, left  -Continue with PTA eyedrops    Chronic kidney disease  Creatinine stable at 1.2  Continue to monitor while here     Diet:  Cardiac healthy diet  DVT Prophylaxis: Pneumatic Compression Devices  Hoyt Catheter: Not present  Lines: None     Cardiac Monitoring: None  Code Status:   FULL    Clinically Significant Risk Factors Present on Admission             # Overweight: Estimated body mass index is 25.79 kg/m  as calculated from the following:    Height as of this encounter: 1.575 m (5' 2\").    Weight as of this encounter: 64 kg (141 lb).           Disposition Plan      Expected Discharge Date: 02/06/2023                  Naida Kenyon MD  Hospitalist Service  [facility]  Securely message with [employer] (more info)  Text page via [employer] Paging/Directory     ______________________________________________________________________    Chief Complaint   Weakness  Increased cough with green sputum  Sore throat with painful swallowing    History is obtained from the patient    History of Present Illness   Tawnya Salinas is a 73 year old female with complex medical history including Sjogren's disease/rheumatoid arthritis, ITP, pulmonary fibrosis, mild " pulmonary hypertension, hepatic cirrhosis decompensated by esophageal and splenic varices, hypothyroidism,neutrophic Keratitis corneal melt left eye, 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  Now her mouth is chronically dry from Sjogren's and uses glycerin.  Denies fever but feels increased chills in the last day.  Also states that she has a chronic cough since he got COVID about a year and a half ago, however physical cough has increased in the last few days now associated with thick greenish sputum.  No hemoptysis, chest pain or shortness of breath.  Also endorses some dysuria with micturition and and frequency.  Denies diarrhea or abdominal pain. Lives in senior living building independently  She went to an urgent urgent care felt patient had possible pneumonia on x-ray and referred her to the ED for further management    In the ED, patient was afebrile, somewhat tachycardic with heart rates 100 pressure 110/57 saturating 93 to 94% on room air.Labs obtained at [facility] urgent care, CBC WBC 8.9 hemoglobin 14.9 platelets 104. Chest x-ray obtained showed mild interstitial opacities with a basilar predominance likely due to chronic interstitial fibrosis and bronchiectasis. Mild superimposed patchy opacities in the left lung base.  She was given IV ceftriaxone.    Past Medical History    Past Medical History:   Diagnosis Date     Cirrhosis (H)      Corneal ulcer      Hypertension      Hypertension      Hypothyroidism      Idiopathic thrombocytopenic purpura (ITP) (H)      Pulmonary fibrosis (H)      Pulmonary fibrosis (H)      Pulmonary hypertension (H)      Rheumatoid arthritis (H)      Sjogren's disease (H)      Sjogren's  syndrome (H)        Past Surgical History   Past Surgical History:   Procedure Laterality Date     CATARACT IOL, RT/LT Bilateral ~0950-4670     cholecystectomy  1985     CONJUNCTIVAL LIMBAL ALLOGRAFT WITH AMNIOTIC MEMBRANE Left 10/21/2019    Procedure: 2. Amniotic membrane transplantation, left eye ;  Surgeon: Grayson Reid MD;  [facility]     CRYOTHERAPY Left 1/7/2020    Procedure: Cryotherapy;  Surgeon: Britt Ruiz MD;  [facility]     CV RIGHT HEART CATH MEASUREMENTS RECORDED N/A 6/15/2020    Procedure: CV RIGHT HEART CATH;  Surgeon: Micha Bustillo MD;  Location:  HEART CARDIAC CATH LAB     CV RIGHT HEART EXERCISE STRESS STUDY N/A 6/15/2020    Procedure: Stress Drug Study;  Surgeon: Micha Bustillo MD;  Location:  HEART CARDIAC CATH LAB     ELBOW SURGERY       EVISCERATION EYE Left 5/28/2020    Procedure: 1. Evisceration of left eye, with placement of a 16 mm silicone implant,  ;  Surgeon: Oma Banerjee MD;  Location: [facility] OR     HC REMOVAL GALLBLADDER      Description: Cholecystectomy;  Proc Date: 01/01/1985;     INTRAVITREAL INJECTION GAS/TPA/METHOTREXATE/ANTIBIOTICS Left 1/7/2020    Procedure: Left eye, injection of intravitreal antibiotics (vancomycin and amphotericin);  Surgeon: Britt Ruiz MD;  [facility]     KERATOPLASTY PENETRATING Left 10/21/2019    Procedure: 1. Penetrating keratoplasty (8.5mm into 8.5mm), left eye ;  Surgeon: Grayson Reid MD;  [facility]     TARSORRHAPHY Left 10/21/2019    Procedure: 3. Suture tarsorrhaphy, left eye;  Surgeon: Grayson Reid MD;  [facility] OR     TARSORRHAPHY Left 5/28/2020    Procedure: 2. Temporary tarsorrhaphy, left.;  Surgeon: Oma Banerjee MD;  [facility] OR     VITRECTOMY PARSPLANA WITH 25 GAUGE SYSTEM Left 1/7/2020    Procedure: Left eye, 25 Gauge pars plana vitrectomy with vitreous biopsy, Anterior Chamber Washout;  Surgeon:  Britt Ruiz MD;  Location:  OR       Prior to Admission Medications   Prior to Admission Medications   Prescriptions Last Dose Informant Patient Reported? Taking?   Vitamin D, Cholecalciferol, 1000 units CAPS   No No   Sig: Take 1,000 Units by mouth daily   Patient taking differently: Take 500 Units by mouth daily   albuterol (PROVENTIL) (2.5 MG/3ML) 0.083% neb solution 2/4/2023  No Yes   Sig: Take 1 vial (2.5 mg) by nebulization 4 times daily   amLODIPine (NORVASC) 2.5 MG tablet 2/3/2023  No Yes   Sig: TAKE 1 TABLET BY MOUTH IN THE MORNING   Patient taking differently: Take 2.5 mg by mouth daily   artificial saliva (BIOTENE MT) SOLN solution Unknown  Yes Yes   Sig: Swish and spit 1-2 sprays in mouth every 2 hours as needed for dry mouth Uses spray or rinse depending on what she can find in stock   aspirin 81 MG EC tablet 2/3/2023  Yes Yes   Sig: Take 81 mg by mouth daily   azaTHIOprine (IMURAN) 50 MG tablet 2/3/2023  No Yes   Sig: Take 0.5 tablets (25 mg) by mouth daily Talking 25mg per specialist   carboxymethylcellulose PF (REFRESH PLUS) 0.5 % ophthalmic solution Past Week  Yes Yes   Sig: Place 1 drop into the right eye 4 times daily as needed for dry eyes   carvedilol (COREG) 3.125 MG tablet 2/3/2023  No Yes   Sig: Take 1 tablet (3.125 mg) by mouth every evening   erythromycin (ROMYCIN) 5 MG/GM ophthalmic ointment   No No   Sig: Place 0.5 inches Into the left eye At Bedtime   gentamicin (GARAMYCIN) 0.3 % ophthalmic ointment   No No   Sig: Place 0.5 inches Into the left eye 4 times daily And before bed.   levothyroxine (SYNTHROID/LEVOTHROID) 125 MCG tablet 2/3/2023  No Yes   Sig: Take 1 tablet (125 mcg) by mouth daily   spironolactone (ALDACTONE) 50 MG tablet 2/3/2023  No Yes   Sig: Take 1 tablet (50 mg) by mouth daily   ursodiol (ACTIGALL) 300 MG capsule 2/3/2023  No Yes   Sig: Take 1 capsule (300 mg) by mouth 2 times daily      Facility-Administered Medications: None        Review of Systems     The 10 point Review of Systems is negative other than noted in the HPI or here.     Social History   I have reviewed this patient's social history and updated it with pertinent information if needed.  Social History     Tobacco Use     Smoking status: Never     Smokeless tobacco: Never   Vaping Use     Vaping Use: Never used   Substance Use Topics     Alcohol use: No     Drug use: No       Family History   I have reviewed this patient's family history and updated it with pertinent information if needed.  Family History   Problem Relation Age of Onset     Breast Cancer Mother 80.00     Colon Cancer Mother      LUNG DISEASE Father      Diabetes Sister      Other Cancer Sister         brain cancer     Deep Vein Thrombosis (DVT) Maternal Grandmother      Glaucoma No family hx of      Macular Degeneration No family hx of      Anesthesia Reaction No family hx of      Cardiovascular No family hx of      Breast Cancer Maternal Grandmother 70.00       Allergies   Allergies   Allergen Reactions     Augmentin [Amoxicillin-Pot Clavulanate] Hives     2/4/23 tolerated ceftriaxone given at urgency room     Sulfamethoxazole-Trimethoprim         Physical Exam   Vital Signs: Temp: 98.4  F (36.9  C) Temp src: Oral BP: 110/57 Pulse: 100   Resp: 28 SpO2: 93 % O2 Device: None (Room air)    Weight: 141 lbs 0 oz  General: AAOx3, NAD  HEENT: Oral mucous membranes very dry.  Erythematous pharynx with white exudates seen posteriorly  CV: RRR, normal S1S2, no murmur, clicks, rubs  Resp: Clear to auscultation bilaterally, no wheezes, rhonchi  Abd: Soft, non-tender, BS+, no masses appreciated  Extremities: Radial and pedal pulses intact and symmetric, no pedal edema  Neuro: No lateralizing symptoms or focal neurologic deficits      Medical Decision Making     75 MINUTES SPENT BY ME on the date of service doing chart review, history, exam, documentation & further activities per the note.      Data     I have personally reviewed the following  data over the past 24 hrs:    ALT: 13 AST: 29 AP: 171 (H) TBILI: 1.6 (H)   ALB: 2.3 (L) TOT PROTEIN: 9.0 (H) LIPASE: N/A       Trop: N/A BNP: 763       TSH: 0.97 T4: N/A A1C: N/A       Procal: 0.86 (H) CRP: N/A Lactic Acid: N/A         Imaging results reviewed over the past 24 hrs:   Recent Results (from the past 24 hour(s))   Chest CT w/o contrast    Narrative    EXAM: CT CHEST W/O CONTRAST  LOCATION: [facility]  DATE/TIME: 2/4/2023 6:45 PM    INDICATION: Abnormal chest x-ray.  COMPARISON: Chest x-ray 2/4/2023. High-resolution chest CT 9/24/2020  TECHNIQUE: CT chest without IV contrast. Multiplanar reformats were obtained. Dose reduction techniques were used.  CONTRAST: None.    FINDINGS:   LUNGS AND PLEURA: New peribronchovascular nodularity throughout the right lung with a mid to upper lung predominance. Mild right pulmonary airway thickening. Small area of airspace opacity within the anterior aspect of the right upper lobe (image #   series 4) Stable, mild peripheral honeycombing within the bilateral lung bases.    MEDIASTINUM/AXILLAE: Mildly prominent central pulmonary arteries suggesting pulmonary hypertension. Normal sized visualized mediastinal and hilar lymph nodes. Calcified atherosclerotic changes of a normal caliber thoracic aorta.    CORONARY ARTERY CALCIFICATION: Moderate.    UPPER ABDOMEN: Partially visualized splenomegaly. Nodular hepatic contours suggesting cirrhosis. Hyperdense changes within the bilateral renal rick suggesting medullary nephrocalcinosis.    MUSCULOSKELETAL: Degenerative changes, visualized cervical and mid thoracic spine.      Impression    IMPRESSION:   1.  New bronchovascular nodularity throughout the right lung. Differential diagnostic considerations include endobronchial spread of infection versus bronchiolitis versus and percent activity pneumonitis.  2.  Stable peripheral fibrotic changes within the bilateral lung bases with a UIP type  pattern.          No

## 2023-02-08 NOTE — ED PROVIDER NOTE - ATTENDING APP SHARED VISIT CONTRIBUTION OF CARE
53-year-old male past medical history noted presents for evaluation of rash to the back of his neck and head.  Patient was diagnosed with folliculitis few months ago.  States this is the same and has medication waiting for him at the pharmacy.  Patient also complains of feeling shaky for the last 2 days.  Patient states he has a history of mild tremor but this feels worse.  Patient was diagnosed with syphilis in November and treated with penicillin.  Patient states he has been following with PMD and is due to get another injection.  On exam patient in NAD, AAOx3, positive folliculitis to posterior neck and scalp, no drainage, no LAD, neck is supple, significantly with steady gait, positive tremor

## 2023-02-08 NOTE — ED ADULT TRIAGE NOTE - CHIEF COMPLAINT QUOTE
c/o not feeling  right very shaky since Monday. pt is currently being treated rash on head.  Denies alcohol or drug use

## 2023-11-07 ENCOUNTER — OUTPATIENT (OUTPATIENT)
Dept: OUTPATIENT SERVICES | Facility: HOSPITAL | Age: 53
LOS: 1 days | End: 2023-11-07
Payer: COMMERCIAL

## 2023-11-07 VITALS
RESPIRATION RATE: 16 BRPM | SYSTOLIC BLOOD PRESSURE: 120 MMHG | TEMPERATURE: 98 F | HEART RATE: 76 BPM | WEIGHT: 220.02 LBS | DIASTOLIC BLOOD PRESSURE: 76 MMHG | HEIGHT: 69 IN | OXYGEN SATURATION: 98 %

## 2023-11-07 DIAGNOSIS — Z01.818 ENCOUNTER FOR OTHER PREPROCEDURAL EXAMINATION: ICD-10-CM

## 2023-11-07 DIAGNOSIS — Z96.642 PRESENCE OF LEFT ARTIFICIAL HIP JOINT: Chronic | ICD-10-CM

## 2023-11-07 DIAGNOSIS — K80.20 CALCULUS OF GALLBLADDER WITHOUT CHOLECYSTITIS WITHOUT OBSTRUCTION: ICD-10-CM

## 2023-11-07 DIAGNOSIS — Z98.890 OTHER SPECIFIED POSTPROCEDURAL STATES: Chronic | ICD-10-CM

## 2023-11-07 LAB
ALBUMIN SERPL ELPH-MCNC: 4.4 G/DL — SIGNIFICANT CHANGE UP (ref 3.5–5.2)
ALBUMIN SERPL ELPH-MCNC: 4.4 G/DL — SIGNIFICANT CHANGE UP (ref 3.5–5.2)
ALP SERPL-CCNC: 85 U/L — SIGNIFICANT CHANGE UP (ref 30–115)
ALP SERPL-CCNC: 85 U/L — SIGNIFICANT CHANGE UP (ref 30–115)
ALT FLD-CCNC: 82 U/L — HIGH (ref 0–41)
ALT FLD-CCNC: 82 U/L — HIGH (ref 0–41)
ANION GAP SERPL CALC-SCNC: 15 MMOL/L — HIGH (ref 7–14)
ANION GAP SERPL CALC-SCNC: 15 MMOL/L — HIGH (ref 7–14)
APTT BLD: 26.9 SEC — LOW (ref 27–39.2)
APTT BLD: 26.9 SEC — LOW (ref 27–39.2)
AST SERPL-CCNC: 40 U/L — SIGNIFICANT CHANGE UP (ref 0–41)
AST SERPL-CCNC: 40 U/L — SIGNIFICANT CHANGE UP (ref 0–41)
BASOPHILS # BLD AUTO: 0.04 K/UL — SIGNIFICANT CHANGE UP (ref 0–0.2)
BASOPHILS # BLD AUTO: 0.04 K/UL — SIGNIFICANT CHANGE UP (ref 0–0.2)
BASOPHILS NFR BLD AUTO: 0.5 % — SIGNIFICANT CHANGE UP (ref 0–1)
BASOPHILS NFR BLD AUTO: 0.5 % — SIGNIFICANT CHANGE UP (ref 0–1)
BILIRUB SERPL-MCNC: 0.3 MG/DL — SIGNIFICANT CHANGE UP (ref 0.2–1.2)
BILIRUB SERPL-MCNC: 0.3 MG/DL — SIGNIFICANT CHANGE UP (ref 0.2–1.2)
BUN SERPL-MCNC: 17 MG/DL — SIGNIFICANT CHANGE UP (ref 10–20)
BUN SERPL-MCNC: 17 MG/DL — SIGNIFICANT CHANGE UP (ref 10–20)
CALCIUM SERPL-MCNC: 9.2 MG/DL — SIGNIFICANT CHANGE UP (ref 8.4–10.5)
CALCIUM SERPL-MCNC: 9.2 MG/DL — SIGNIFICANT CHANGE UP (ref 8.4–10.5)
CHLORIDE SERPL-SCNC: 104 MMOL/L — SIGNIFICANT CHANGE UP (ref 98–110)
CHLORIDE SERPL-SCNC: 104 MMOL/L — SIGNIFICANT CHANGE UP (ref 98–110)
CO2 SERPL-SCNC: 22 MMOL/L — SIGNIFICANT CHANGE UP (ref 17–32)
CO2 SERPL-SCNC: 22 MMOL/L — SIGNIFICANT CHANGE UP (ref 17–32)
CREAT SERPL-MCNC: 0.9 MG/DL — SIGNIFICANT CHANGE UP (ref 0.7–1.5)
CREAT SERPL-MCNC: 0.9 MG/DL — SIGNIFICANT CHANGE UP (ref 0.7–1.5)
EGFR: 101 ML/MIN/1.73M2 — SIGNIFICANT CHANGE UP
EGFR: 101 ML/MIN/1.73M2 — SIGNIFICANT CHANGE UP
EOSINOPHIL # BLD AUTO: 0.3 K/UL — SIGNIFICANT CHANGE UP (ref 0–0.7)
EOSINOPHIL # BLD AUTO: 0.3 K/UL — SIGNIFICANT CHANGE UP (ref 0–0.7)
EOSINOPHIL NFR BLD AUTO: 3.9 % — SIGNIFICANT CHANGE UP (ref 0–8)
EOSINOPHIL NFR BLD AUTO: 3.9 % — SIGNIFICANT CHANGE UP (ref 0–8)
GLUCOSE SERPL-MCNC: 74 MG/DL — SIGNIFICANT CHANGE UP (ref 70–99)
GLUCOSE SERPL-MCNC: 74 MG/DL — SIGNIFICANT CHANGE UP (ref 70–99)
HCT VFR BLD CALC: 45.6 % — SIGNIFICANT CHANGE UP (ref 42–52)
HCT VFR BLD CALC: 45.6 % — SIGNIFICANT CHANGE UP (ref 42–52)
HGB BLD-MCNC: 14.7 G/DL — SIGNIFICANT CHANGE UP (ref 14–18)
HGB BLD-MCNC: 14.7 G/DL — SIGNIFICANT CHANGE UP (ref 14–18)
IMM GRANULOCYTES NFR BLD AUTO: 0.4 % — HIGH (ref 0.1–0.3)
IMM GRANULOCYTES NFR BLD AUTO: 0.4 % — HIGH (ref 0.1–0.3)
INR BLD: 0.97 RATIO — SIGNIFICANT CHANGE UP (ref 0.65–1.3)
INR BLD: 0.97 RATIO — SIGNIFICANT CHANGE UP (ref 0.65–1.3)
LYMPHOCYTES # BLD AUTO: 1.8 K/UL — SIGNIFICANT CHANGE UP (ref 1.2–3.4)
LYMPHOCYTES # BLD AUTO: 1.8 K/UL — SIGNIFICANT CHANGE UP (ref 1.2–3.4)
LYMPHOCYTES # BLD AUTO: 23.6 % — SIGNIFICANT CHANGE UP (ref 20.5–51.1)
LYMPHOCYTES # BLD AUTO: 23.6 % — SIGNIFICANT CHANGE UP (ref 20.5–51.1)
MCHC RBC-ENTMCNC: 30.2 PG — SIGNIFICANT CHANGE UP (ref 27–31)
MCHC RBC-ENTMCNC: 30.2 PG — SIGNIFICANT CHANGE UP (ref 27–31)
MCHC RBC-ENTMCNC: 32.2 G/DL — SIGNIFICANT CHANGE UP (ref 32–37)
MCHC RBC-ENTMCNC: 32.2 G/DL — SIGNIFICANT CHANGE UP (ref 32–37)
MCV RBC AUTO: 93.6 FL — SIGNIFICANT CHANGE UP (ref 80–94)
MCV RBC AUTO: 93.6 FL — SIGNIFICANT CHANGE UP (ref 80–94)
MONOCYTES # BLD AUTO: 0.65 K/UL — HIGH (ref 0.1–0.6)
MONOCYTES # BLD AUTO: 0.65 K/UL — HIGH (ref 0.1–0.6)
MONOCYTES NFR BLD AUTO: 8.5 % — SIGNIFICANT CHANGE UP (ref 1.7–9.3)
MONOCYTES NFR BLD AUTO: 8.5 % — SIGNIFICANT CHANGE UP (ref 1.7–9.3)
NEUTROPHILS # BLD AUTO: 4.82 K/UL — SIGNIFICANT CHANGE UP (ref 1.4–6.5)
NEUTROPHILS # BLD AUTO: 4.82 K/UL — SIGNIFICANT CHANGE UP (ref 1.4–6.5)
NEUTROPHILS NFR BLD AUTO: 63.1 % — SIGNIFICANT CHANGE UP (ref 42.2–75.2)
NEUTROPHILS NFR BLD AUTO: 63.1 % — SIGNIFICANT CHANGE UP (ref 42.2–75.2)
NRBC # BLD: 0 /100 WBCS — SIGNIFICANT CHANGE UP (ref 0–0)
NRBC # BLD: 0 /100 WBCS — SIGNIFICANT CHANGE UP (ref 0–0)
PLATELET # BLD AUTO: 282 K/UL — SIGNIFICANT CHANGE UP (ref 130–400)
PLATELET # BLD AUTO: 282 K/UL — SIGNIFICANT CHANGE UP (ref 130–400)
PMV BLD: 10.2 FL — SIGNIFICANT CHANGE UP (ref 7.4–10.4)
PMV BLD: 10.2 FL — SIGNIFICANT CHANGE UP (ref 7.4–10.4)
POTASSIUM SERPL-MCNC: 4.6 MMOL/L — SIGNIFICANT CHANGE UP (ref 3.5–5)
POTASSIUM SERPL-MCNC: 4.6 MMOL/L — SIGNIFICANT CHANGE UP (ref 3.5–5)
POTASSIUM SERPL-SCNC: 4.6 MMOL/L — SIGNIFICANT CHANGE UP (ref 3.5–5)
POTASSIUM SERPL-SCNC: 4.6 MMOL/L — SIGNIFICANT CHANGE UP (ref 3.5–5)
PROT SERPL-MCNC: 6.9 G/DL — SIGNIFICANT CHANGE UP (ref 6–8)
PROT SERPL-MCNC: 6.9 G/DL — SIGNIFICANT CHANGE UP (ref 6–8)
PROTHROM AB SERPL-ACNC: 11 SEC — SIGNIFICANT CHANGE UP (ref 9.95–12.87)
PROTHROM AB SERPL-ACNC: 11 SEC — SIGNIFICANT CHANGE UP (ref 9.95–12.87)
RBC # BLD: 4.87 M/UL — SIGNIFICANT CHANGE UP (ref 4.7–6.1)
RBC # BLD: 4.87 M/UL — SIGNIFICANT CHANGE UP (ref 4.7–6.1)
RBC # FLD: 13.5 % — SIGNIFICANT CHANGE UP (ref 11.5–14.5)
RBC # FLD: 13.5 % — SIGNIFICANT CHANGE UP (ref 11.5–14.5)
SODIUM SERPL-SCNC: 141 MMOL/L — SIGNIFICANT CHANGE UP (ref 135–146)
SODIUM SERPL-SCNC: 141 MMOL/L — SIGNIFICANT CHANGE UP (ref 135–146)
WBC # BLD: 7.64 K/UL — SIGNIFICANT CHANGE UP (ref 4.8–10.8)
WBC # BLD: 7.64 K/UL — SIGNIFICANT CHANGE UP (ref 4.8–10.8)
WBC # FLD AUTO: 7.64 K/UL — SIGNIFICANT CHANGE UP (ref 4.8–10.8)
WBC # FLD AUTO: 7.64 K/UL — SIGNIFICANT CHANGE UP (ref 4.8–10.8)

## 2023-11-07 PROCEDURE — 99214 OFFICE O/P EST MOD 30 MIN: CPT | Mod: 25

## 2023-11-07 PROCEDURE — 85730 THROMBOPLASTIN TIME PARTIAL: CPT

## 2023-11-07 PROCEDURE — 80053 COMPREHEN METABOLIC PANEL: CPT

## 2023-11-07 PROCEDURE — 85610 PROTHROMBIN TIME: CPT

## 2023-11-07 PROCEDURE — 36415 COLL VENOUS BLD VENIPUNCTURE: CPT

## 2023-11-07 PROCEDURE — 85025 COMPLETE CBC W/AUTO DIFF WBC: CPT

## 2023-11-07 NOTE — H&P PST ADULT - NSICDXFAMILYHX_GEN_ALL_CORE_FT
FAMILY HISTORY:  Mother  Still living? No  FH: breast cancer, Age at diagnosis: Age Unknown     FAMILY HISTORY:  Mother  Still living? No  FH: breast cancer, Age at diagnosis: Age Unknown  FH: colon cancer, Age at diagnosis: Age Unknown

## 2023-11-07 NOTE — H&P PST ADULT - HISTORY OF PRESENT ILLNESS
54 Y/O MALE PT TO PAST WITH C/O GALLSTONES, RUQ PAIN FOR                PT NOW FOR SCHEDULED PROCEDURE ( [facility]) . PT DENIES ANY CP SOB PALP COUGH DYSURIA FEVER URI.   Anesthesia Alert  NO--Difficult Airway  NO--History of neck surgery or radiation  NO--Limited ROM of neck  NO--History of Malignant hyperthermia  NO--Personal or family history of Pseudocholinesterase deficiency.  NO--Prior Anesthesia Complication  NO--Latex Allergy  NO--Loose teeth  NO--History of Rheumatoid Arthritis  NO--JAJA  NO--Bleeding risk  NO--Other_____     52 Y/O MALE PT TO PAST WITH C/O GALLSTONES, RUQ PAIN, SHARP  FOR PAST YEAR  PT NOW FOR SCHEDULED PROCEDURE ( [facility]) . PT DENIES ANY CP SOB PALP COUGH DYSURIA FEVER URI.   Anesthesia Alert  NO--Difficult Airway  NO--History of neck surgery or radiation  NO--Limited ROM of neck  NO--History of Malignant hyperthermia  NO--Personal or family history of Pseudocholinesterase deficiency.  NO--Prior Anesthesia Complication  NO--Latex Allergy  NO--Loose teeth  NO--History of Rheumatoid Arthritis  NO--JAJA  NO--Bleeding risk  NO--Other_____      RESULT SUMMARY:  58.2 points  The higher the score (maximum 58.2), the higher the functional status.    9.89 METs        INPUTS:  Take care of self —> 2.75 = Yes  Walk indoors —> 1.75 = Yes  Walk 1&ndash;2 blocks on level ground —> 2.75 = Yes  Climb a flight of stairs or walk up a hill —> 5.5 = Yes  Run a short distance —> 8 = Yes  Do light work around the house —> 2.7 = Yes  Do moderate work around the house —> 3.5 = Yes  Do heavy work around the house —> 8 = Yes  Do yardwork —> 4.5 = Yes  Have sexual relations —> 5.25 = Yes  Participate in moderate recreational activities —> 6 = Yes  Participate in strenuous sports —> 7.5 = Yes      RESULT SUMMARY:  0 points  Class I Risk    3.9 %  30-day risk of death, MI, or cardiac arrest          INPUTS:  Elevated-risk surgery —> 0 = No  History of ischemic heart disease —> 0 = No  History of congestive heart failure —> 0 = No  History of cerebrovascular disease —> 0 = No  Pre-operative treatment with insulin —> 0 = No  Pre-operative creatinine >2 mg/dL / 176.8 µmol/L —> 0 = No

## 2023-11-07 NOTE — H&P PST ADULT - NSICDXPASTMEDICALHX_GEN_ALL_CORE_FT
PAST MEDICAL HISTORY:  Mass on back      PAST MEDICAL HISTORY:  Mass on back     OA (osteoarthritis)

## 2023-11-08 DIAGNOSIS — Z01.818 ENCOUNTER FOR OTHER PREPROCEDURAL EXAMINATION: ICD-10-CM

## 2023-11-08 DIAGNOSIS — K80.20 CALCULUS OF GALLBLADDER WITHOUT CHOLECYSTITIS WITHOUT OBSTRUCTION: ICD-10-CM

## 2023-11-13 ENCOUNTER — OUTPATIENT (OUTPATIENT)
Dept: OUTPATIENT SERVICES | Facility: HOSPITAL | Age: 53
LOS: 1 days | Discharge: HOME | End: 2023-11-13
Payer: COMMERCIAL

## 2023-11-13 VITALS
HEIGHT: 69 IN | WEIGHT: 222.01 LBS | TEMPERATURE: 97 F | DIASTOLIC BLOOD PRESSURE: 60 MMHG | HEART RATE: 57 BPM | OXYGEN SATURATION: 98 % | RESPIRATION RATE: 17 BRPM | SYSTOLIC BLOOD PRESSURE: 138 MMHG

## 2023-11-13 VITALS — SYSTOLIC BLOOD PRESSURE: 180 MMHG | RESPIRATION RATE: 17 BRPM | DIASTOLIC BLOOD PRESSURE: 81 MMHG | HEART RATE: 75 BPM

## 2023-11-13 DIAGNOSIS — Z96.642 PRESENCE OF LEFT ARTIFICIAL HIP JOINT: Chronic | ICD-10-CM

## 2023-11-13 DIAGNOSIS — K80.20 CALCULUS OF GALLBLADDER WITHOUT CHOLECYSTITIS WITHOUT OBSTRUCTION: ICD-10-CM

## 2023-11-13 DIAGNOSIS — K80.10 CALCULUS OF GALLBLADDER WITH CHRONIC CHOLECYSTITIS WITHOUT OBSTRUCTION: ICD-10-CM

## 2023-11-13 DIAGNOSIS — M19.90 UNSPECIFIED OSTEOARTHRITIS, UNSPECIFIED SITE: ICD-10-CM

## 2023-11-13 DIAGNOSIS — Z88.0 ALLERGY STATUS TO PENICILLIN: ICD-10-CM

## 2023-11-13 DIAGNOSIS — Z98.890 OTHER SPECIFIED POSTPROCEDURAL STATES: Chronic | ICD-10-CM

## 2023-11-13 DIAGNOSIS — Z80.3 FAMILY HISTORY OF MALIGNANT NEOPLASM OF BREAST: ICD-10-CM

## 2023-11-13 PROCEDURE — C1889: CPT

## 2023-11-13 PROCEDURE — 88304 TISSUE EXAM BY PATHOLOGIST: CPT | Mod: 26

## 2023-11-13 PROCEDURE — 88304 TISSUE EXAM BY PATHOLOGIST: CPT

## 2023-11-13 RX ORDER — SODIUM CHLORIDE 9 MG/ML
1000 INJECTION, SOLUTION INTRAVENOUS
Refills: 0 | Status: DISCONTINUED | OUTPATIENT
Start: 2023-11-13 | End: 2023-11-13

## 2023-11-13 RX ORDER — OXYCODONE AND ACETAMINOPHEN 5; 325 MG/1; MG/1
1 TABLET ORAL
Qty: 16 | Refills: 0
Start: 2023-11-13 | End: 2023-11-16

## 2023-11-13 RX ORDER — OXYCODONE HYDROCHLORIDE 5 MG/1
5 TABLET ORAL ONCE
Refills: 0 | Status: DISCONTINUED | OUTPATIENT
Start: 2023-11-13 | End: 2023-11-13

## 2023-11-13 RX ORDER — ACETAMINOPHEN 500 MG
1000 TABLET ORAL ONCE
Refills: 0 | Status: DISCONTINUED | OUTPATIENT
Start: 2023-11-13 | End: 2023-11-13

## 2023-11-13 RX ORDER — HYDROMORPHONE HYDROCHLORIDE 2 MG/ML
0.5 INJECTION INTRAMUSCULAR; INTRAVENOUS; SUBCUTANEOUS
Refills: 0 | Status: DISCONTINUED | OUTPATIENT
Start: 2023-11-13 | End: 2023-11-13

## 2023-11-13 RX ADMIN — SODIUM CHLORIDE 100 MILLILITER(S): 9 INJECTION, SOLUTION INTRAVENOUS at 11:02

## 2023-11-13 RX ADMIN — HYDROMORPHONE HYDROCHLORIDE 0.5 MILLIGRAM(S): 2 INJECTION INTRAMUSCULAR; INTRAVENOUS; SUBCUTANEOUS at 11:16

## 2023-11-13 RX ADMIN — HYDROMORPHONE HYDROCHLORIDE 0.5 MILLIGRAM(S): 2 INJECTION INTRAMUSCULAR; INTRAVENOUS; SUBCUTANEOUS at 11:01

## 2023-11-13 NOTE — ASU PATIENT PROFILE, ADULT - FALL HARM RISK - HARM RISK INTERVENTIONS

## 2023-11-13 NOTE — ASU DISCHARGE PLAN (ADULT/PEDIATRIC) - ASU DC SPECIAL INSTRUCTIONSFT
Activity: No heavy lifting > 10 lbs for 2 weeks. Avoid straining or excessive activity x 6 weeks.     Dressings: Dermabond will fall off on their own. Do not scrub wounds. You may shower but do not bathe. May use ice packs for pain and swelling.     Pain control: You may take over-the-counter tylenol and motrin three times per day with food for up to 3 days. Percocet was sent to your pharmacy. Please do not drive, operate machinery, or make important decisions while taking this medication. Please take only for severe pain.     Follow up: Please call the number provided to make an appointment with Dr. Valenzuela in 1-2 weeks. Please call with any questions or concerns including fevers, worsening pain, pus from the wounds, or redness of the skin.

## 2023-11-13 NOTE — ASU DISCHARGE PLAN (ADULT/PEDIATRIC) - CARE PROVIDER_API CALL
Justine Valenzuela  Surgery  [address] [place] [address], [address]  [place] [address]  Phone: [phone]  Fax: [phone]  Follow Up Time:

## 2023-11-13 NOTE — ASU PATIENT PROFILE, ADULT - NSICDXPASTMEDICALHX_GEN_ALL_CORE_FT
Telephone Encounter by Sherry Mendoza CMA at 05/11/18 12:38 PM     Author:  Sherry Mendoza CMA Service:  (none) Author Type:  Certified Medical Assistant     Filed:  05/11/18 12:41 PM Encounter Date:  5/11/2018 Status:  Signed     :  Sherry Mendoza CMA (Certified Medical Assistant)            Called to pharmacy--spoke with Kathleen--she stated medication went to \"Hold\" but she processed and should be ready for  in about an hour.[CH1.1M]    Patient notified.[CH1.1T]  Celi Thomas voices understanding[CH1.2T].[CH1.2M]      Revision History        User Key Date/Time User Provider Type Action    > CH1.2 05/11/18 12:41 PM Sherry Mendoza CMA Certified Medical Assistant Sign     CH1.1 05/11/18 12:38 PM Sherry Mendoza CMA Certified Medical Assistant     M - Manual, T - Template             PAST MEDICAL HISTORY:  Mass on back     OA (osteoarthritis)

## 2023-11-13 NOTE — ASU PATIENT PROFILE, ADULT - NSICDXPASTSURGICALHX_GEN_ALL_CORE_FT
PAST SURGICAL HISTORY:  H/O removal of cyst 30 yrs ago    History of left hip replacement     History of surgery back cyst removed 2022

## 2023-11-13 NOTE — BRIEF OPERATIVE NOTE - OPERATION/FINDINGS
Laparoscopic Cholecystectomy, critical view of safety achieved, cystic duct clipped x3, cystic artery clipped x3. hemostasis

## 2023-11-14 LAB
SURGICAL PATHOLOGY STUDY: SIGNIFICANT CHANGE UP
SURGICAL PATHOLOGY STUDY: SIGNIFICANT CHANGE UP

## 2023-12-06 NOTE — ED ADULT TRIAGE NOTE - BP NONINVASIVE DIASTOLIC (MM HG)
75 Render In Strict Bullet Format?: No Initiate Treatment: AM: \\nWash face with gentle face wash\\nApply clindamycin lotion to face and affected areas on the neck\\nApply moisturizer with SPF.\\n\\nPM:\\nWash face with Gentle face wash.\\nApply clindamycin lotion to face and affected areas on the neck.\\nApply a pea size amount of Aklief to face every other night, working up to nightly as tolerated. Detail Level: Zone Samples Given: Greg Initiate Treatment: Apply Clobetasol ointment to affected area twice a day for one week, then once daily until symptoms resolve.
